# Patient Record
Sex: FEMALE | Race: WHITE | NOT HISPANIC OR LATINO | Employment: OTHER | URBAN - METROPOLITAN AREA
[De-identification: names, ages, dates, MRNs, and addresses within clinical notes are randomized per-mention and may not be internally consistent; named-entity substitution may affect disease eponyms.]

---

## 2018-08-06 ENCOUNTER — OFFICE VISIT (OUTPATIENT)
Dept: MEDICAL GROUP | Facility: PHYSICIAN GROUP | Age: 68
End: 2018-08-06
Payer: MEDICARE

## 2018-08-06 DIAGNOSIS — J45.20 MILD INTERMITTENT ASTHMA WITHOUT COMPLICATION: ICD-10-CM

## 2018-08-06 DIAGNOSIS — I10 BENIGN ESSENTIAL HTN: ICD-10-CM

## 2018-08-06 DIAGNOSIS — E03.4 HYPOTHYROIDISM DUE TO ACQUIRED ATROPHY OF THYROID: ICD-10-CM

## 2018-08-06 DIAGNOSIS — E78.2 MIXED HYPERLIPIDEMIA: ICD-10-CM

## 2018-08-06 DIAGNOSIS — Z00.00 WELL ADULT EXAM: ICD-10-CM

## 2018-08-06 PROCEDURE — 99204 OFFICE O/P NEW MOD 45 MIN: CPT | Performed by: FAMILY MEDICINE

## 2018-08-06 RX ORDER — LISINOPRIL AND HYDROCHLOROTHIAZIDE 25; 20 MG/1; MG/1
1 TABLET ORAL DAILY
Qty: 90 TAB | Refills: 1 | Status: SHIPPED | OUTPATIENT
Start: 2018-08-06 | End: 2018-12-04 | Stop reason: SDUPTHER

## 2018-08-06 RX ORDER — LEVOTHYROXINE SODIUM 0.1 MG/1
TABLET ORAL
COMMUNITY
Start: 2016-09-22 | End: 2018-08-06 | Stop reason: SDUPTHER

## 2018-08-06 RX ORDER — LEVOTHYROXINE SODIUM 0.1 MG/1
100 TABLET ORAL
Qty: 90 TAB | Refills: 1 | Status: SHIPPED | OUTPATIENT
Start: 2018-08-06 | End: 2018-12-04 | Stop reason: SDUPTHER

## 2018-08-06 RX ORDER — SIMVASTATIN 40 MG
TABLET ORAL
Qty: 90 TAB | Refills: 1 | Status: SHIPPED | OUTPATIENT
Start: 2018-08-06 | End: 2018-12-04 | Stop reason: SDUPTHER

## 2018-08-06 RX ORDER — SIMVASTATIN 40 MG
TABLET ORAL
COMMUNITY
Start: 2016-12-12 | End: 2018-08-06 | Stop reason: SDUPTHER

## 2018-08-06 RX ORDER — LISINOPRIL AND HYDROCHLOROTHIAZIDE 25; 20 MG/1; MG/1
TABLET ORAL
COMMUNITY
Start: 2016-12-19 | End: 2018-08-06 | Stop reason: SDUPTHER

## 2018-08-06 RX ORDER — ALBUTEROL SULFATE 90 UG/1
2 AEROSOL, METERED RESPIRATORY (INHALATION) EVERY 6 HOURS PRN
Qty: 8.5 G | Refills: 6 | Status: SHIPPED | OUTPATIENT
Start: 2018-08-06

## 2018-08-06 RX ORDER — ALBUTEROL SULFATE 90 UG/1
2 AEROSOL, METERED RESPIRATORY (INHALATION) EVERY 6 HOURS PRN
COMMUNITY
End: 2018-08-06 | Stop reason: SDUPTHER

## 2018-08-06 ASSESSMENT — ENCOUNTER SYMPTOMS
CHILLS: 0
CARDIOVASCULAR NEGATIVE: 1
CONSTITUTIONAL NEGATIVE: 1
DOUBLE VISION: 0
GASTROINTESTINAL NEGATIVE: 1
HEARTBURN: 0
TINGLING: 0
HEADACHES: 0
BLURRED VISION: 0
RESPIRATORY NEGATIVE: 1
PSYCHIATRIC NEGATIVE: 1
PALPITATIONS: 0
NEUROLOGICAL NEGATIVE: 1
HEMOPTYSIS: 0
BRUISES/BLEEDS EASILY: 0
MYALGIAS: 0
MUSCULOSKELETAL NEGATIVE: 1
DEPRESSION: 0
NAUSEA: 0
EYES NEGATIVE: 1
FEVER: 0
DIZZINESS: 0
COUGH: 0

## 2018-08-06 ASSESSMENT — PATIENT HEALTH QUESTIONNAIRE - PHQ9: CLINICAL INTERPRETATION OF PHQ2 SCORE: 0

## 2018-08-07 NOTE — PROGRESS NOTES
New paitnSubjective:      Jacklyn Olson is a 68 y.o. female who presents with Establish Care and Medication Refill (all medications)            New patient well controlled htn and thyroid  Lives in [place] 1/2 of year with   Just moved here from [place] to be closer to [facility]    1. Well adult exam    - OCCULT BLOOD FECES IMMUNOASSAY (FIT); Future  - levothyroxine (SYNTHROID) 100 MCG Tab; Take 1 Tab by mouth Every morning on an empty stomach.  Dispense: 90 Tab; Refill: 1  - lisinopril-hydrochlorothiazide (PRINZIDE, ZESTORETIC) 20-25 MG per tablet; Take 1 Tab by mouth every day.  Dispense: 90 Tab; Refill: 1  - simvastatin (ZOCOR) 40 MG Tab; Take 1 tablet by mouth daily at bedtime  Dispense: 90 Tab; Refill: 1  - FREE THYROXINE; Future  - COMP METABOLIC PANEL; Future  - LIPID PROFILE; Future  - TRIIDOTHYRONINE; Future  - TSH; Future  - CBC WITHOUT DIFFERENTIAL; Future    2. Benign essential HTN  Currently treated for HTN, taking meds with no CP or sob, monitors bp at home periodically. controlled    - OCCULT BLOOD FECES IMMUNOASSAY (FIT); Future  - levothyroxine (SYNTHROID) 100 MCG Tab; Take 1 Tab by mouth Every morning on an empty stomach.  Dispense: 90 Tab; Refill: 1  - lisinopril-hydrochlorothiazide (PRINZIDE, ZESTORETIC) 20-25 MG per tablet; Take 1 Tab by mouth every day.  Dispense: 90 Tab; Refill: 1  - simvastatin (ZOCOR) 40 MG Tab; Take 1 tablet by mouth daily at bedtime  Dispense: 90 Tab; Refill: 1  - FREE THYROXINE; Future  - COMP METABOLIC PANEL; Future  - LIPID PROFILE; Future  - TRIIDOTHYRONINE; Future  - TSH; Future  - CBC WITHOUT DIFFERENTIAL; Future    3. Hypothyroidism due to acquired atrophy of thyroid  Patient is being treated for hypothyroidism, currently taking meds, no symptoms of fast or slow heartbeat and energy level steady. No new hair loss or skin symptoms. Labs have been checked in past year and are stable on current dose.  controlled    - OCCULT BLOOD FECES IMMUNOASSAY (FIT);  Future  - levothyroxine (SYNTHROID) 100 MCG Tab; Take 1 Tab by mouth Every morning on an empty stomach.  Dispense: 90 Tab; Refill: 1  - lisinopril-hydrochlorothiazide (PRINZIDE, ZESTORETIC) 20-25 MG per tablet; Take 1 Tab by mouth every day.  Dispense: 90 Tab; Refill: 1  - simvastatin (ZOCOR) 40 MG Tab; Take 1 tablet by mouth daily at bedtime  Dispense: 90 Tab; Refill: 1  - FREE THYROXINE; Future  - COMP METABOLIC PANEL; Future  - LIPID PROFILE; Future  - TRIIDOTHYRONINE; Future  - TSH; Future  - CBC WITHOUT DIFFERENTIAL; Future    4. Mixed hyperlipidemia  Currently treated for HLD, taking meds with no new myalgias or joint pain, watching fats in diet  controlled    - OCCULT BLOOD FECES IMMUNOASSAY (FIT); Future  - levothyroxine (SYNTHROID) 100 MCG Tab; Take 1 Tab by mouth Every morning on an empty stomach.  Dispense: 90 Tab; Refill: 1  - lisinopril-hydrochlorothiazide (PRINZIDE, ZESTORETIC) 20-25 MG per tablet; Take 1 Tab by mouth every day.  Dispense: 90 Tab; Refill: 1  - simvastatin (ZOCOR) 40 MG Tab; Take 1 tablet by mouth daily at bedtime  Dispense: 90 Tab; Refill: 1  - FREE THYROXINE; Future  - COMP METABOLIC PANEL; Future  - LIPID PROFILE; Future  - TRIIDOTHYRONINE; Future  - TSH; Future  - CBC WITHOUT DIFFERENTIAL; Future    5. Mild intermittent asthma without complication  The patient's current medical issue is well controlled on the current therapy with no new symptoms or worsening    - albuterol 108 (90 Base) MCG/ACT Aero Soln inhalation aerosol; Inhale 2 Puffs by mouth every 6 hours as needed for Shortness of Breath.  Dispense: 8.5 g; Refill: 6    Past Medical History:  No date: Asthma  No date: Hyperlipidemia  No date: Hypertension  No date: Thyroid disease  Past Surgical History:  No date: CHOLECYSTECTOMY  Smoking status: Former Smoker                                                              Packs/day: 0.00      Years: 0.00         Types: Cigarettes     Quit date: 8/6/2010  Smokeless tobacco: Never  Used                      Alcohol use: No              History reviewed.  No pertinent family history.      Current Outpatient Prescriptions: •  levothyroxine (SYNTHROID) 100 MCG Tab, Take 1 Tab by mouth Every morning on an empty stomach., Disp: 90 Tab, Rfl: 1•  lisinopril-hydrochlorothiazide (PRINZIDE, ZESTORETIC) 20-25 MG per tablet, Take 1 Tab by mouth every day., Disp: 90 Tab, Rfl: 1•  simvastatin (ZOCOR) 40 MG Tab, Take 1 tablet by mouth daily at bedtime, Disp: 90 Tab, Rfl: 1•  albuterol 108 (90 Base) MCG/ACT Aero Soln inhalation aerosol, Inhale 2 Puffs by mouth every 6 hours as needed for Shortness of Breath., Disp: 8.5 g, Rfl: 6    Patient was instructed on the use of medications, either prescriptions or OTC and informed on when the appropriate follow up time period should be. In addition, patient was also instructed that should any acute worsening occur that they should notify this clinic asap or call 911.            Review of Systems   Constitutional: Negative.  Negative for chills and fever.   HENT: Negative.  Negative for hearing loss.    Eyes: Negative.  Negative for blurred vision and double vision.   Respiratory: Negative.  Negative for cough and hemoptysis.    Cardiovascular: Negative.  Negative for chest pain and palpitations.   Gastrointestinal: Negative.  Negative for heartburn and nausea.   Genitourinary: Negative.  Negative for dysuria.   Musculoskeletal: Negative.  Negative for myalgias.   Skin: Negative.  Negative for rash.   Neurological: Negative.  Negative for dizziness, tingling and headaches.   Endo/Heme/Allergies: Negative.  Does not bruise/bleed easily.   Psychiatric/Behavioral: Negative.  Negative for depression and suicidal ideas.   All other systems reviewed and are negative.         Objective:     There were no vitals taken for this visit.     Physical Exam   Constitutional: She is oriented to person, place, and time. She appears well-developed and well-nourished. No distress.    HENT:   Head: Normocephalic and atraumatic.   Right Ear: External ear normal.   Left Ear: External ear normal.   Nose: Nose normal.   Mouth/Throat: Oropharynx is clear and moist. No oropharyngeal exudate.   Eyes: Pupils are equal, round, and reactive to light. Right eye exhibits no discharge. Left eye exhibits no discharge. No scleral icterus.   Neck: Normal range of motion. Neck supple. No JVD present. No tracheal deviation present. No thyromegaly present.   Cardiovascular: Normal rate, regular rhythm, normal heart sounds and intact distal pulses.  Exam reveals no gallop and no friction rub.    No murmur heard.  Pulmonary/Chest: Effort normal and breath sounds normal. No stridor. No respiratory distress. She has no wheezes. She has no rales. She exhibits no tenderness.   Abdominal: Soft. She exhibits no distension. There is no tenderness.   Lymphadenopathy:     She has no cervical adenopathy.   Neurological: She is alert and oriented to person, place, and time. No cranial nerve deficit.   Skin: She is not diaphoretic.   Psychiatric: She has a normal mood and affect. Her behavior is normal. Judgment and thought content normal.   Nursing note and vitals reviewed.              Assessment/Plan:     1. Well adult exam    - OCCULT BLOOD FECES IMMUNOASSAY (FIT); Future  - levothyroxine (SYNTHROID) 100 MCG Tab; Take 1 Tab by mouth Every morning on an empty stomach.  Dispense: 90 Tab; Refill: 1  - lisinopril-hydrochlorothiazide (PRINZIDE, ZESTORETIC) 20-25 MG per tablet; Take 1 Tab by mouth every day.  Dispense: 90 Tab; Refill: 1  - simvastatin (ZOCOR) 40 MG Tab; Take 1 tablet by mouth daily at bedtime  Dispense: 90 Tab; Refill: 1  - FREE THYROXINE; Future  - COMP METABOLIC PANEL; Future  - LIPID PROFILE; Future  - TRIIDOTHYRONINE; Future  - TSH; Future  - CBC WITHOUT DIFFERENTIAL; Future    2. Benign essential HTN    - OCCULT BLOOD FECES IMMUNOASSAY (FIT); Future  - levothyroxine (SYNTHROID) 100 MCG Tab; Take 1 Tab by  mouth Every morning on an empty stomach.  Dispense: 90 Tab; Refill: 1  - lisinopril-hydrochlorothiazide (PRINZIDE, ZESTORETIC) 20-25 MG per tablet; Take 1 Tab by mouth every day.  Dispense: 90 Tab; Refill: 1  - simvastatin (ZOCOR) 40 MG Tab; Take 1 tablet by mouth daily at bedtime  Dispense: 90 Tab; Refill: 1  - FREE THYROXINE; Future  - COMP METABOLIC PANEL; Future  - LIPID PROFILE; Future  - TRIIDOTHYRONINE; Future  - TSH; Future  - CBC WITHOUT DIFFERENTIAL; Future    3. Hypothyroidism due to acquired atrophy of thyroid    - OCCULT BLOOD FECES IMMUNOASSAY (FIT); Future  - levothyroxine (SYNTHROID) 100 MCG Tab; Take 1 Tab by mouth Every morning on an empty stomach.  Dispense: 90 Tab; Refill: 1  - lisinopril-hydrochlorothiazide (PRINZIDE, ZESTORETIC) 20-25 MG per tablet; Take 1 Tab by mouth every day.  Dispense: 90 Tab; Refill: 1  - simvastatin (ZOCOR) 40 MG Tab; Take 1 tablet by mouth daily at bedtime  Dispense: 90 Tab; Refill: 1  - FREE THYROXINE; Future  - COMP METABOLIC PANEL; Future  - LIPID PROFILE; Future  - TRIIDOTHYRONINE; Future  - TSH; Future  - CBC WITHOUT DIFFERENTIAL; Future    4. Mixed hyperlipidemia    - OCCULT BLOOD FECES IMMUNOASSAY (FIT); Future  - levothyroxine (SYNTHROID) 100 MCG Tab; Take 1 Tab by mouth Every morning on an empty stomach.  Dispense: 90 Tab; Refill: 1  - lisinopril-hydrochlorothiazide (PRINZIDE, ZESTORETIC) 20-25 MG per tablet; Take 1 Tab by mouth every day.  Dispense: 90 Tab; Refill: 1  - simvastatin (ZOCOR) 40 MG Tab; Take 1 tablet by mouth daily at bedtime  Dispense: 90 Tab; Refill: 1  - FREE THYROXINE; Future  - COMP METABOLIC PANEL; Future  - LIPID PROFILE; Future  - TRIIDOTHYRONINE; Future  - TSH; Future  - CBC WITHOUT DIFFERENTIAL; Future    5. Mild intermittent asthma without complication    - albuterol 108 (90 Base) MCG/ACT Aero Soln inhalation aerosol; Inhale 2 Puffs by mouth every 6 hours as needed for Shortness of Breath.  Dispense: 8.5 g; Refill: 6

## 2018-08-29 ENCOUNTER — TELEPHONE (OUTPATIENT)
Dept: MEDICAL GROUP | Facility: PHYSICIAN GROUP | Age: 68
End: 2018-08-29

## 2018-08-29 ENCOUNTER — HOSPITAL ENCOUNTER (OUTPATIENT)
Facility: MEDICAL CENTER | Age: 68
End: 2018-08-29
Attending: FAMILY MEDICINE
Payer: MEDICARE

## 2018-08-29 PROCEDURE — 82274 ASSAY TEST FOR BLOOD FECAL: CPT

## 2018-08-29 NOTE — TELEPHONE ENCOUNTER
Spoke with patient and let them know Jesus Tomlinson M.D.'s message. Pt is scheduled to see Dr. tomlinson on 09/06/18 at 1:30pm.

## 2018-08-29 NOTE — TELEPHONE ENCOUNTER
----- Message from Jesus Link M.D. sent at 8/29/2018  9:56 AM PDT -----  This patient needs an appointment within the next week. Please schedule this with the patient so we may discuss their lab results

## 2018-09-04 ENCOUNTER — TELEPHONE (OUTPATIENT)
Dept: MEDICAL GROUP | Facility: PHYSICIAN GROUP | Age: 68
End: 2018-09-04

## 2018-09-06 ENCOUNTER — OFFICE VISIT (OUTPATIENT)
Dept: MEDICAL GROUP | Facility: PHYSICIAN GROUP | Age: 68
End: 2018-09-06
Payer: MEDICARE

## 2018-09-06 VITALS
RESPIRATION RATE: 12 BRPM | DIASTOLIC BLOOD PRESSURE: 80 MMHG | SYSTOLIC BLOOD PRESSURE: 140 MMHG | WEIGHT: 178 LBS | OXYGEN SATURATION: 96 % | TEMPERATURE: 97.9 F | HEIGHT: 65 IN | BODY MASS INDEX: 29.66 KG/M2 | HEART RATE: 111 BPM

## 2018-09-06 DIAGNOSIS — I10 BENIGN ESSENTIAL HTN: ICD-10-CM

## 2018-09-06 DIAGNOSIS — Z23 NEED FOR INFLUENZA VACCINATION: ICD-10-CM

## 2018-09-06 DIAGNOSIS — E74.39 GLUCOSE INTOLERANCE: ICD-10-CM

## 2018-09-06 DIAGNOSIS — Z12.31 ENCOUNTER FOR SCREENING MAMMOGRAM FOR HIGH-RISK PATIENT: ICD-10-CM

## 2018-09-06 DIAGNOSIS — R19.5 POSITIVE FIT (FECAL IMMUNOCHEMICAL TEST): ICD-10-CM

## 2018-09-06 DIAGNOSIS — E78.2 MIXED HYPERLIPIDEMIA: ICD-10-CM

## 2018-09-06 DIAGNOSIS — E03.4 HYPOTHYROIDISM DUE TO ACQUIRED ATROPHY OF THYROID: ICD-10-CM

## 2018-09-06 PROCEDURE — 90662 IIV NO PRSV INCREASED AG IM: CPT | Performed by: FAMILY MEDICINE

## 2018-09-06 PROCEDURE — 99214 OFFICE O/P EST MOD 30 MIN: CPT | Performed by: FAMILY MEDICINE

## 2018-09-06 PROCEDURE — G0008 ADMIN INFLUENZA VIRUS VAC: HCPCS | Performed by: FAMILY MEDICINE

## 2018-09-06 ASSESSMENT — ENCOUNTER SYMPTOMS
CHILLS: 0
RESPIRATORY NEGATIVE: 1
NEUROLOGICAL NEGATIVE: 1
TINGLING: 0
NAUSEA: 0
FEVER: 0
PALPITATIONS: 0
COUGH: 0
BLURRED VISION: 0
HEARTBURN: 0
EYES NEGATIVE: 1
MYALGIAS: 0
CARDIOVASCULAR NEGATIVE: 1
GASTROINTESTINAL NEGATIVE: 1
DIZZINESS: 0
HEADACHES: 0
CONSTITUTIONAL NEGATIVE: 1
PSYCHIATRIC NEGATIVE: 1
BRUISES/BLEEDS EASILY: 0
MUSCULOSKELETAL NEGATIVE: 1
DOUBLE VISION: 0
DEPRESSION: 0
HEMOPTYSIS: 0

## 2018-09-06 NOTE — PROGRESS NOTES
Subjective:      Jacklyn Olson is a 68 y.o. female who presents with Hypertension            1. Need for influenza vaccination    - INFLUENZA VACCINE, HIGH DOSE (65+ ONLY)    2. Encounter for screening mammogram for high-risk patient    - [id number]; Future    3. Benign essential HTN  Currently treated for HTN, taking meds with no CP or sob, monitors bp at home periodically. controlled      4. Hypothyroidism due to acquired atrophy of thyroid  Patient is being treated for hypothyroidism, currently taking meds, no symptoms of fast or slow heartbeat and energy level steady. No new hair loss or skin symptoms. Labs have been checked in past year and are stable on current dose.  controlled      5. Mixed hyperlipidemia  Currently treated for HLD, taking meds with no new myalgias or joint pain, watching fats in diet  controlled  Labs ok for thyroid and hld but fbs of 122, advised on low carb diet and exercise    Past Medical History:  No date: Asthma  No date: Hyperlipidemia  No date: Hypertension  No date: Thyroid disease  Past Surgical History:  No date: CHOLECYSTECTOMY  Smoking status: Former Smoker                                                              Packs/day: 0.00      Years: 0.00         Types: Cigarettes     Quit date: 8/6/2010  Smokeless tobacco: Never Used                      Alcohol use: No              History reviewed.  No pertinent family history.      Current Outpatient Prescriptions: •  levothyroxine (SYNTHROID) 100 MCG Tab, Take 1 Tab by mouth Every morning on an empty stomach., Disp: 90 Tab, Rfl: 1•  lisinopril-hydrochlorothiazide (PRINZIDE, ZESTORETIC) 20-25 MG per tablet, Take 1 Tab by mouth every day., Disp: 90 Tab, Rfl: 1•  simvastatin (ZOCOR) 40 MG Tab, Take 1 tablet by mouth daily at bedtime, Disp: 90 Tab, Rfl: 1•  albuterol 108 (90 Base) MCG/ACT Aero Soln inhalation aerosol, Inhale 2 Puffs by mouth every 6 hours as needed for Shortness of Breath., Disp: 8.5 g, Rfl: 6    Patient  "was instructed on the use of medications, either prescriptions or OTC and informed on when the appropriate follow up time period should be. In addition, patient was also instructed that should any acute worsening occur that they should notify this clinic asap or call 911.            Review of Systems   Constitutional: Negative.  Negative for chills and fever.   HENT: Negative.  Negative for hearing loss.    Eyes: Negative.  Negative for blurred vision and double vision.   Respiratory: Negative.  Negative for cough and hemoptysis.    Cardiovascular: Negative.  Negative for chest pain and palpitations.   Gastrointestinal: Negative.  Negative for heartburn and nausea.   Genitourinary: Negative.  Negative for dysuria.   Musculoskeletal: Negative.  Negative for myalgias.   Skin: Negative.  Negative for rash.   Neurological: Negative.  Negative for dizziness, tingling and headaches.   Endo/Heme/Allergies: Negative.  Does not bruise/bleed easily.   Psychiatric/Behavioral: Negative.  Negative for depression and suicidal ideas.   All other systems reviewed and are negative.         Objective:     /80   Pulse (!) 111   Temp 36.6 °C (97.9 °F)   Resp 12   Ht 1.651 m (5' 5\")   Wt 80.7 kg (178 lb)   SpO2 96%   BMI 29.62 kg/m²      Physical Exam   Constitutional: She is oriented to person, place, and time. She appears well-developed and well-nourished. No distress.   HENT:   Head: Normocephalic and atraumatic.   Mouth/Throat: Oropharynx is clear and moist. No oropharyngeal exudate.   Eyes: Pupils are equal, round, and reactive to light.   Cardiovascular: Normal rate, regular rhythm, normal heart sounds and intact distal pulses.  Exam reveals no gallop and no friction rub.    No murmur heard.  Pulmonary/Chest: Effort normal and breath sounds normal. No respiratory distress. She has no wheezes. She has no rales. She exhibits no tenderness.   Neurological: She is alert and oriented to person, place, and time.   Skin: She " is not diaphoretic.   Psychiatric: She has a normal mood and affect. Her behavior is normal. Judgment and thought content normal.   Nursing note and vitals reviewed.              Assessment/Plan:     1. Need for influenza vaccination    - INFLUENZA VACCINE, HIGH DOSE (65+ ONLY)    2. Encounter for screening mammogram for high-risk patient    - [id number]; Future    3. Benign essential HTN      4. Hypothyroidism due to acquired atrophy of thyroid      5. Mixed hyperlipidemia

## 2018-09-08 DIAGNOSIS — E78.2 MIXED HYPERLIPIDEMIA: ICD-10-CM

## 2018-09-08 DIAGNOSIS — I10 BENIGN ESSENTIAL HTN: ICD-10-CM

## 2018-09-08 DIAGNOSIS — Z00.00 WELL ADULT EXAM: ICD-10-CM

## 2018-09-08 DIAGNOSIS — E03.4 HYPOTHYROIDISM DUE TO ACQUIRED ATROPHY OF THYROID: ICD-10-CM

## 2018-09-09 LAB — HEMOCCULT STL QL IA: POSITIVE

## 2018-09-10 ENCOUNTER — TELEPHONE (OUTPATIENT)
Dept: MEDICAL GROUP | Facility: PHYSICIAN GROUP | Age: 68
End: 2018-09-10

## 2018-09-10 NOTE — TELEPHONE ENCOUNTER
----- Message from Jesus Link M.D. sent at 9/10/2018  8:13 AM PDT -----  Her fit test was positive for some blood, a referral has been placed to the gastroenterologist for her to do a further workup

## 2018-12-04 DIAGNOSIS — Z00.00 WELL ADULT EXAM: ICD-10-CM

## 2018-12-04 DIAGNOSIS — E03.4 HYPOTHYROIDISM DUE TO ACQUIRED ATROPHY OF THYROID: ICD-10-CM

## 2018-12-04 DIAGNOSIS — I10 BENIGN ESSENTIAL HTN: ICD-10-CM

## 2018-12-04 DIAGNOSIS — E78.2 MIXED HYPERLIPIDEMIA: ICD-10-CM

## 2018-12-04 RX ORDER — LISINOPRIL AND HYDROCHLOROTHIAZIDE 25; 20 MG/1; MG/1
TABLET ORAL
Qty: 90 TAB | Refills: 1 | Status: SHIPPED | OUTPATIENT
Start: 2018-12-04 | End: 2019-08-23 | Stop reason: SDUPTHER

## 2018-12-04 RX ORDER — LEVOTHYROXINE SODIUM 0.1 MG/1
TABLET ORAL
Qty: 90 TAB | Refills: 1 | Status: SHIPPED | OUTPATIENT
Start: 2018-12-04 | End: 2019-08-23 | Stop reason: SDUPTHER

## 2018-12-04 RX ORDER — SIMVASTATIN 40 MG
TABLET ORAL
Qty: 90 TAB | Refills: 1 | Status: SHIPPED | OUTPATIENT
Start: 2018-12-04 | End: 2019-08-23 | Stop reason: SDUPTHER

## 2018-12-04 NOTE — TELEPHONE ENCOUNTER
Was the patient seen in the last year in this department? Yes    Does patient have an active prescription for medications requested? Yes    Received Request Via: Patient     Last Visit: 9/6/18  Last Labs: 8/29/18

## 2019-08-22 ENCOUNTER — OFFICE VISIT (OUTPATIENT)
Dept: MEDICAL GROUP | Facility: PHYSICIAN GROUP | Age: 69
End: 2019-08-22
Payer: MEDICARE

## 2019-08-22 VITALS
TEMPERATURE: 98.1 F | DIASTOLIC BLOOD PRESSURE: 66 MMHG | RESPIRATION RATE: 12 BRPM | OXYGEN SATURATION: 95 % | HEIGHT: 65 IN | HEART RATE: 94 BPM | BODY MASS INDEX: 29.99 KG/M2 | SYSTOLIC BLOOD PRESSURE: 132 MMHG | WEIGHT: 180 LBS

## 2019-08-22 DIAGNOSIS — R79.9 ABNORMAL FINDING OF BLOOD CHEMISTRY: ICD-10-CM

## 2019-08-22 DIAGNOSIS — E03.4 HYPOTHYROIDISM DUE TO ACQUIRED ATROPHY OF THYROID: ICD-10-CM

## 2019-08-22 DIAGNOSIS — E74.39 GLUCOSE INTOLERANCE: ICD-10-CM

## 2019-08-22 DIAGNOSIS — I10 BENIGN ESSENTIAL HTN: ICD-10-CM

## 2019-08-22 DIAGNOSIS — Z00.00 MEDICARE ANNUAL WELLNESS VISIT, SUBSEQUENT: ICD-10-CM

## 2019-08-22 DIAGNOSIS — M71.20 BAKER CYST, UNSPECIFIED LATERALITY: ICD-10-CM

## 2019-08-22 DIAGNOSIS — E78.2 MIXED HYPERLIPIDEMIA: ICD-10-CM

## 2019-08-22 PROCEDURE — G0438 PPPS, INITIAL VISIT: HCPCS | Performed by: FAMILY MEDICINE

## 2019-08-22 ASSESSMENT — ACTIVITIES OF DAILY LIVING (ADL): BATHING_REQUIRES_ASSISTANCE: 0

## 2019-08-22 ASSESSMENT — PATIENT HEALTH QUESTIONNAIRE - PHQ9: CLINICAL INTERPRETATION OF PHQ2 SCORE: 0

## 2019-08-22 ASSESSMENT — ENCOUNTER SYMPTOMS: GENERAL WELL-BEING: GOOD

## 2019-08-22 NOTE — LETTER
[facility]  Jesus Link M.D.  [address]  [facility] [address]  Fax: [phone]   Authorization for Release/Disclosure of   Protected Health Information   Name: KANCHAN OLSON : 1950 SSN: [id number]   Address: [address] [place] [address] Phone:    [phone] (home)    I authorize the entity listed below to release/disclose the PHI below to:   [facility]/Jesus Link M.D. and Jesus Link M.D.   Provider or Entity Name:  [facility]   Address   City, [facility], [facility]   Phone:      Fax:     Reason for request: continuity of care   Information to be released:    [  ] LAST COLONOSCOPY,  including any PATH REPORT and follow-up  [  ] LAST FIT/COLOGUARD RESULT [  ] LAST DEXA  [xx] LAST MAMMOGRAM  [  ] LAST PAP  [  ] LAST LABS [  ] RETINA EXAM REPORT  [  ] IMMUNIZATION RECORDS  [  ] Release all info      [  ] Check here and initial the line next to each item to release ALL health information INCLUDING  _____ Care and treatment for drug and / or alcohol abuse  _____ HIV testing, infection status, or AIDS  _____ Genetic Testing    DATES OF SERVICE OR TIME PERIOD TO BE DISCLOSED: _____________  I understand and acknowledge that:  * This Authorization may be revoked at any time by you in writing, except if your health information has already been used or disclosed.  * Your health information that will be used or disclosed as a result of you signing this authorization could be re-disclosed by the recipient. If this occurs, your re-disclosed health information may no longer be protected by State or Federal laws.  * You may refuse to sign this Authorization. Your refusal will not affect your ability to obtain treatment.  * This Authorization becomes effective upon signing and will  on (date) __________.      If no date is indicated, this Authorization will  one (1) year from the signature date.    Name: Kanchan Olson    Signature:   Date:     2019       PLEASE FAX  REQUESTED RECORDS BACK TO: [phone]

## 2019-08-22 NOTE — PROGRESS NOTES
Chief Complaint   Patient presents with   • Annual Exam     AWV         HPI:  Jacklyn Olson is a 69 y.o. here for Medicare Annual Wellness Visit     Patient Active Problem List    Diagnosis Date Noted   • Glucose intolerance 09/06/2018   • Benign essential HTN 08/06/2018   • Hypothyroidism due to acquired atrophy of thyroid 08/06/2018   • Mixed hyperlipidemia 08/06/2018       Current Outpatient Medications   Medication Sig Dispense Refill   • Calcium Carbonate-Vitamin D (CALTRATE 600+D PO) Take  by mouth.     • lisinopril-hydrochlorothiazide (PRINZIDE, ZESTORETIC) 20-25 MG per tablet TAKE ONE TABLET BY MOUTH DAILY 90 Tab 1   • levothyroxine (SYNTHROID) 100 MCG Tab TAKE ONE TABLET BY MOUTH EVERY MORNING ON AN EMPTY STOMACH 90 Tab 1   • simvastatin (ZOCOR) 40 MG Tab TAKE ONE TABLET BY MOUTH EVERY NIGHT AT BEDTIME 90 Tab 1   • albuterol 108 (90 Base) MCG/ACT Aero Soln inhalation aerosol Inhale 2 Puffs by mouth every 6 hours as needed for Shortness of Breath. 8.5 g 6     No current facility-administered medications for this visit.             Current supplements as per medication list.       Allergies: Patient has no known allergies.    Current social contact/activities: family     She  reports that she quit smoking about 9 years ago. Her smoking use included cigarettes. She has never used smokeless tobacco. She reports that she drinks alcohol. She reports that she does not use drugs.  Counseling given: Not Answered      DPA/Advanced Directive:  Patient does not have an Advanced Directive.  A packet and workshop information was given on Advanced Directives.    ROS:    Gait: Uses no assistive device  Ostomy: No  Other tubes: No  Amputations: No  Chronic oxygen use: No  Last eye exam: last year  Wears hearing aids: No   : Denies any urinary leakage during the last 6 months    Screening:    Depression Screening    Little interest or pleasure in doing things?  0 - not at all  Feeling down, depressed , or hopeless? 0 - not at  all  Patient Health Questionnaire Score: 0     If depressive symptoms identified deferred to follow up visit unless specifically addressed in assessment and plan.    Interpretation of PHQ-9 Total Score   Score Severity   1-4 No Depression   5-9 Mild Depression   10-14 Moderate Depression   15-19 Moderately Severe Depression   20-27 Severe Depression    Screening for Cognitive Impairment    Three Minute Recall (village, kitchen, baby) 3/3    Abdoul clock face with all 12 numbers and set the hands to show 10 past 10.  Yes    Cognitive concerns identified deferred for follow up unless specifically addressed in assessment and plan.    Fall Risk Assessment    Has the patient had two or more falls in the last year or any fall with injury in the last year?  No    Safety Assessment    Throw rugs on floor.  Yes  Handrails on all stairs.  No  Good lighting in all hallways.  Yes  Difficulty hearing.  No  Patient counseled about all safety risks that were identified.    Functional Assessment ADLs    Are there any barriers preventing you from cooking for yourself or meeting nutritional needs?  No.    Are there any barriers preventing you from driving safely or obtaining transportation?  No.    Are there any barriers preventing you from using a telephone or calling for help?  No.    Are there any barriers preventing you from shopping?  No.    Are there any barriers preventing you from taking care of your own finances?  No.    Are there any barriers preventing you from managing your medications?  No.    Are there any barriers preventing you from showering, bathing or dressing yourself?  No.    Are you currently engaging in any exercise or physical activity?  Yes.     What is your perception of your health?  Good.      Health Maintenance Summary                Annual Wellness Visit Overdue 1950     HEPATITIS C SCREENING Overdue 1950     PAP SMEAR Overdue 8/1/1971     MAMMOGRAM Overdue 8/1/1990     IMM ZOSTER VACCINES Overdue  "2013      Done 2013 Imm Admin: Zoster Vaccine Live (ZVL) (Zostavax)    IMM INFLUENZA Next Due 2019      Done 2018 Imm Admin: Influenza Vaccine Adult HD     Patient has more history with this topic...    BONE DENSITY Next Due 2021      Done 2016     IMM DTaP/Tdap/Td Vaccine Next Due 2023      Done 2013 Ext Imm: Tdap (ADACEL) (Tetanus, diphtheria, acellular pertussis)     Patient has more history with this topic...    COLONOSCOPY Next Due 2028      Done 2018 REFERRAL TO GI FOR COLONOSCOPY     Patient has more history with this topic...          Patient Care Team:  Jesus Link M.D. as PCP - General (Family Medicine)        Social History     Tobacco Use   • Smoking status: Former Smoker     Types: Cigarettes     Last attempt to quit: 2010     Years since quittin.0   • Smokeless tobacco: Never Used   Substance Use Topics   • Alcohol use: Yes     Comment: occational   • Drug use: No     No family history on file.  She  has a past medical history of Asthma, Hyperlipidemia, Hypertension, and Thyroid disease.   Past Surgical History:   Procedure Laterality Date   • CHOLECYSTECTOMY         Exam:   /66   Pulse 94   Temp 36.7 °C (98.1 °F)   Resp 12   Ht 1.651 m (5' 5\")   Wt 81.6 kg (180 lb)   SpO2 95%  Body mass index is 29.95 kg/m².    Hearing good.    Dentition good  Alert, oriented in no acute distress.  Eye contact is good, speech goal directed, affect calm    Assessment and Plan. The following treatment and monitoring plan is recommended:    1. Medicare annual wellness visit, subsequent  Had mammo will get report    2. Baker cyst, unspecified laterality  Dx 10 years ago with usg, now with pain with ambulation  On exam soft cysts palpable in both popliteal fossas  - REFERRAL TO ORTHOPEDICS    3. Benign essential HTN  Currently treated for HTN, taking meds with no CP or sob, monitors bp at home periodically. controlled      - Comp Metabolic Panel; " Future  - FREE THYROXINE; Future  - TRIIDOTHYRONINE; Future  - TSH; Future  - HEMOGLOBIN A1C; Future  - Lipid Profile; Future  - CBC WITHOUT DIFFERENTIAL; Future    4. Mixed hyperlipidemia  Currently treated for HLD, taking meds with no new myalgias or joint pain, watching fats in diet  controlled      - Comp Metabolic Panel; Future  - FREE THYROXINE; Future  - TRIIDOTHYRONINE; Future  - TSH; Future  - HEMOGLOBIN A1C; Future  - Lipid Profile; Future  - CBC WITHOUT DIFFERENTIAL; Future    5. Glucose intolerance  Needs new labs  - Comp Metabolic Panel; Future  - FREE THYROXINE; Future  - TRIIDOTHYRONINE; Future  - TSH; Future  - HEMOGLOBIN A1C; Future  - Lipid Profile; Future  - CBC WITHOUT DIFFERENTIAL; Future    6. Abnormal finding of blood chemistry     - HEMOGLOBIN A1C; Future    7. Hypothyroidism due to acquired atrophy of thyroid  Patient is being treated for hypothyroidism, currently taking meds, no symptoms of fast or slow heartbeat and energy level steady. No new hair loss or skin symptoms. Labs have been checked in past year and are stable on current dose.  controlled        Past Medical History:   Diagnosis Date   • Asthma    • Hyperlipidemia    • Hypertension    • Thyroid disease      Past Surgical History:   Procedure Laterality Date   • CHOLECYSTECTOMY       Social History     Tobacco Use   • Smoking status: Former Smoker     Types: Cigarettes     Last attempt to quit: 2010     Years since quittin.0   • Smokeless tobacco: Never Used   Substance Use Topics   • Alcohol use: Yes     Comment: occational   • Drug use: No     No family history on file.      Current Outpatient Medications:   •  Calcium Carbonate-Vitamin D (CALTRATE 600+D PO), Take  by mouth., Disp: , Rfl:   •  lisinopril-hydrochlorothiazide (PRINZIDE, ZESTORETIC) 20-25 MG per tablet, TAKE ONE TABLET BY MOUTH DAILY, Disp: 90 Tab, Rfl: 1  •  levothyroxine (SYNTHROID) 100 MCG Tab, TAKE ONE TABLET BY MOUTH EVERY MORNING ON AN EMPTY STOMACH,  Disp: 90 Tab, Rfl: 1  •  simvastatin (ZOCOR) 40 MG Tab, TAKE ONE TABLET BY MOUTH EVERY NIGHT AT BEDTIME, Disp: 90 Tab, Rfl: 1  •  albuterol 108 (90 Base) MCG/ACT Aero Soln inhalation aerosol, Inhale 2 Puffs by mouth every 6 hours as needed for Shortness of Breath., Disp: 8.5 g, Rfl: 6    Patient was instructed on the use of medications, either prescriptions or OTC and informed on when the appropriate follow up time period should be. In addition, patient was also instructed that should any acute worsening occur that they should notify this clinic asap or call 911.          Services suggested: No services needed at this time  Health Care Screening: Age-appropriate preventive services recommended by USPTF and ACIP covered by Medicare were discussed today. Services ordered if indicated and agreed upon by the patient.  Referrals offered: Community-based lifestyle interventions to reduce health risks and promote self-management and wellness, fall prevention, nutrition, physical activity, tobacco-use cessation, weight loss, and mental health services as per orders if indicated.    Discussion today about general wellness and lifestyle habits:    · Prevent falls and reduce trip hazards; Cautioned about securing or removing rugs.  · Have a working fire alarm and carbon monoxide detector;   · Engage in regular physical activity and social activities     Follow-up: No follow-ups on file.

## 2019-08-23 DIAGNOSIS — I10 BENIGN ESSENTIAL HTN: ICD-10-CM

## 2019-08-23 DIAGNOSIS — E03.4 HYPOTHYROIDISM DUE TO ACQUIRED ATROPHY OF THYROID: ICD-10-CM

## 2019-08-23 DIAGNOSIS — Z00.00 WELL ADULT EXAM: ICD-10-CM

## 2019-08-23 DIAGNOSIS — E78.2 MIXED HYPERLIPIDEMIA: ICD-10-CM

## 2019-08-23 RX ORDER — SIMVASTATIN 40 MG
TABLET ORAL
Qty: 90 TAB | Refills: 1 | Status: CANCELLED | OUTPATIENT
Start: 2019-08-23

## 2019-08-23 RX ORDER — LEVOTHYROXINE SODIUM 0.1 MG/1
TABLET ORAL
Qty: 90 TAB | Refills: 1 | Status: CANCELLED | OUTPATIENT
Start: 2019-08-23

## 2019-08-23 RX ORDER — LISINOPRIL AND HYDROCHLOROTHIAZIDE 25; 20 MG/1; MG/1
TABLET ORAL
Qty: 90 TAB | Refills: 1 | Status: CANCELLED | OUTPATIENT
Start: 2019-08-23

## 2019-08-26 RX ORDER — SIMVASTATIN 40 MG
TABLET ORAL
Qty: 90 TAB | Refills: 1 | Status: SHIPPED | OUTPATIENT
Start: 2019-08-26 | End: 2019-09-05 | Stop reason: SDUPTHER

## 2019-08-26 RX ORDER — LEVOTHYROXINE SODIUM 0.1 MG/1
TABLET ORAL
Qty: 90 TAB | Refills: 1 | Status: SHIPPED | OUTPATIENT
Start: 2019-08-26

## 2019-08-26 RX ORDER — LISINOPRIL AND HYDROCHLOROTHIAZIDE 25; 20 MG/1; MG/1
TABLET ORAL
Qty: 90 TAB | Refills: 1 | Status: SHIPPED | OUTPATIENT
Start: 2019-08-26

## 2019-08-30 DIAGNOSIS — E74.39 GLUCOSE INTOLERANCE: ICD-10-CM

## 2019-08-30 DIAGNOSIS — I10 BENIGN ESSENTIAL HTN: ICD-10-CM

## 2019-08-30 DIAGNOSIS — E78.2 MIXED HYPERLIPIDEMIA: ICD-10-CM

## 2019-09-05 DIAGNOSIS — E78.2 MIXED HYPERLIPIDEMIA: ICD-10-CM

## 2019-09-05 DIAGNOSIS — Z00.00 WELL ADULT EXAM: ICD-10-CM

## 2019-09-05 DIAGNOSIS — E03.4 HYPOTHYROIDISM DUE TO ACQUIRED ATROPHY OF THYROID: ICD-10-CM

## 2019-09-05 DIAGNOSIS — I10 BENIGN ESSENTIAL HTN: ICD-10-CM

## 2019-09-05 RX ORDER — SIMVASTATIN 40 MG
TABLET ORAL
Qty: 90 TAB | Refills: 1 | Status: SHIPPED | OUTPATIENT
Start: 2019-09-05

## 2019-09-05 NOTE — TELEPHONE ENCOUNTER
Was the patient seen in the last year in this department? Yes    Does patient have an active prescription for medications requested? Yes    Received Request Via: Patient       [place] patient [place]

## 2021-06-15 ENCOUNTER — HOSPITAL ENCOUNTER (OUTPATIENT)
Dept: HOSPITAL 8 - CFH | Age: 71
Discharge: HOME | End: 2021-06-15
Attending: INTERNAL MEDICINE
Payer: MEDICARE

## 2021-06-15 DIAGNOSIS — Z87.891: ICD-10-CM

## 2021-06-15 DIAGNOSIS — E04.9: ICD-10-CM

## 2021-06-15 DIAGNOSIS — Z12.2: Primary | ICD-10-CM

## 2021-06-15 DIAGNOSIS — R91.1: ICD-10-CM

## 2021-06-15 DIAGNOSIS — I25.10: ICD-10-CM

## 2021-06-15 DIAGNOSIS — Z90.49: ICD-10-CM

## 2021-06-15 PROCEDURE — 71271 CT THORAX LUNG CANCER SCR C-: CPT
